# Patient Record
Sex: FEMALE | Race: WHITE | NOT HISPANIC OR LATINO | URBAN - METROPOLITAN AREA
[De-identification: names, ages, dates, MRNs, and addresses within clinical notes are randomized per-mention and may not be internally consistent; named-entity substitution may affect disease eponyms.]

---

## 2021-12-28 ENCOUNTER — OFFICE (OUTPATIENT)
Dept: URBAN - METROPOLITAN AREA CLINIC 8 | Facility: CLINIC | Age: 76
End: 2021-12-28

## 2021-12-28 VITALS
WEIGHT: 250 LBS | SYSTOLIC BLOOD PRESSURE: 135 MMHG | OXYGEN SATURATION: 98 % | HEART RATE: 85 BPM | DIASTOLIC BLOOD PRESSURE: 68 MMHG | HEIGHT: 65 IN

## 2021-12-28 DIAGNOSIS — D50.9 IRON DEFICIENCY ANEMIA, UNSPECIFIED: ICD-10-CM

## 2021-12-28 DIAGNOSIS — N39.0 URINARY TRACT INFECTION, SITE NOT SPECIFIED: ICD-10-CM

## 2021-12-28 DIAGNOSIS — R74.9 ABNORMAL SERUM ENZYME LEVEL, UNSPECIFIED: ICD-10-CM

## 2021-12-28 PROCEDURE — 99203 OFFICE O/P NEW LOW 30 MIN: CPT | Performed by: NURSE PRACTITIONER

## 2022-01-03 ENCOUNTER — OFFICE (OUTPATIENT)
Dept: URBAN - METROPOLITAN AREA CLINIC 8 | Facility: CLINIC | Age: 77
End: 2022-01-03

## 2022-01-03 LAB
ACTIN (SMOOTH MUSCLE) ANTIBODY: 20 UNITS — HIGH (ref 0–19)
ALK PHOS ISOENZYME: BONE FRACTION: 26 % (ref 14–68)
ALK PHOS ISOENZYME: INTESTINAL FRAC.: 0 % (ref 0–18)
ALK PHOS ISOENZYME: LIVER FRACTION: 74 % (ref 18–85)
ANTINUCLEAR ANTIBODIES, IFA: POSITIVE
CBC, PLATELET, NO DIFFERENTIAL: HEMATOCRIT: 38.1 % (ref 34–46.6)
CBC, PLATELET, NO DIFFERENTIAL: HEMOGLOBIN: 12.9 G/DL (ref 11.1–15.9)
CBC, PLATELET, NO DIFFERENTIAL: MCH: 34.8 PG — HIGH (ref 26.6–33)
CBC, PLATELET, NO DIFFERENTIAL: MCHC: 33.9 G/DL (ref 31.5–35.7)
CBC, PLATELET, NO DIFFERENTIAL: MCV: 103 FL — HIGH (ref 79–97)
CBC, PLATELET, NO DIFFERENTIAL: PLATELETS: 263 X10E3/UL (ref 150–450)
CBC, PLATELET, NO DIFFERENTIAL: RBC: 3.71 X10E6/UL — LOW (ref 3.77–5.28)
CBC, PLATELET, NO DIFFERENTIAL: RDW: 13.3 % (ref 11.7–15.4)
CBC, PLATELET, NO DIFFERENTIAL: WBC: 3.7 X10E3/UL (ref 3.4–10.8)
CERULOPLASMIN: 38.4 MG/DL (ref 19–39)
COMP. METABOLIC PANEL (14): A/G RATIO: 0.9 — LOW (ref 1.2–2.2)
COMP. METABOLIC PANEL (14): ALBUMIN: 3.7 G/DL (ref 3.7–4.7)
COMP. METABOLIC PANEL (14): ALKALINE PHOSPHATASE: 122 IU/L — HIGH (ref 44–121)
COMP. METABOLIC PANEL (14): ALT (SGPT): 25 IU/L (ref 0–32)
COMP. METABOLIC PANEL (14): AST (SGOT): 34 IU/L (ref 0–40)
COMP. METABOLIC PANEL (14): BILIRUBIN, TOTAL: 0.5 MG/DL (ref 0–1.2)
COMP. METABOLIC PANEL (14): BUN/CREATININE RATIO: 10 — LOW (ref 12–28)
COMP. METABOLIC PANEL (14): BUN: 9 MG/DL (ref 8–27)
COMP. METABOLIC PANEL (14): CALCIUM: 9.5 MG/DL (ref 8.7–10.3)
COMP. METABOLIC PANEL (14): CARBON DIOXIDE, TOTAL: 26 MMOL/L (ref 20–29)
COMP. METABOLIC PANEL (14): CHLORIDE: 102 MMOL/L (ref 96–106)
COMP. METABOLIC PANEL (14): CREATININE: 0.87 MG/DL (ref 0.57–1)
COMP. METABOLIC PANEL (14): EGFR IF AFRICN AM: 74 ML/MIN/1.73 (ref 59–?)
COMP. METABOLIC PANEL (14): EGFR IF NONAFRICN AM: 64 ML/MIN/1.73 (ref 59–?)
COMP. METABOLIC PANEL (14): GLOBULIN, TOTAL: 4.1 G/DL (ref 1.5–4.5)
COMP. METABOLIC PANEL (14): GLUCOSE: 107 MG/DL — HIGH (ref 65–99)
COMP. METABOLIC PANEL (14): POTASSIUM: 4.5 MMOL/L (ref 3.5–5.2)
COMP. METABOLIC PANEL (14): PROTEIN, TOTAL: 7.8 G/DL (ref 6–8.5)
COMP. METABOLIC PANEL (14): SODIUM: 140 MMOL/L (ref 134–144)
FANA STAINING PATTERNS: HOMOGENEOUS PATTERN: HIGH
FANA STAINING PATTERNS: NOTE: (no result)
FE+TIBC+FER: FERRITIN: 626 NG/ML — HIGH (ref 15–150)
FE+TIBC+FER: IRON BIND.CAP.(TIBC): 235 UG/DL — LOW (ref 250–450)
FE+TIBC+FER: IRON SATURATION: 41 % (ref 15–55)
FE+TIBC+FER: IRON: 97 UG/DL (ref 27–139)
FE+TIBC+FER: UIBC: 138 UG/DL (ref 118–369)
HEPATITIS PANEL (4): HBSAG SCREEN: NEGATIVE
HEPATITIS PANEL (4): HEP A AB, IGM: NEGATIVE
HEPATITIS PANEL (4): HEP B CORE AB, IGM: NEGATIVE
HEPATITIS PANEL (4): HEP C VIRUS AB: <0.1 S/CO RATIO
MITOCHONDRIAL (M2) ANTIBODY: <20 UNITS
PROTHROMBIN TIME (PT): INR: 1 (ref 0.9–1.2)
PROTHROMBIN TIME (PT): PROTHROMBIN TIME: 10.9 SEC (ref 9.1–12)
VITAMIN B12 AND FOLATE: FOLATE (FOLIC ACID), SERUM: >20 NG/ML
VITAMIN B12 AND FOLATE: VITAMIN B12: 512 PG/ML (ref 232–1245)

## 2022-02-17 ENCOUNTER — ON CAMPUS - OUTPATIENT (OUTPATIENT)
Dept: URBAN - METROPOLITAN AREA HOSPITAL 97 | Facility: HOSPITAL | Age: 77
End: 2022-02-17

## 2022-02-17 DIAGNOSIS — K57.10 DIVERTICULOSIS OF SMALL INTESTINE WITHOUT PERFORATION OR ABS: ICD-10-CM

## 2022-02-17 PROCEDURE — 43235 EGD DIAGNOSTIC BRUSH WASH: CPT | Performed by: SPECIALIST

## 2022-03-08 ENCOUNTER — OFFICE (OUTPATIENT)
Dept: URBAN - METROPOLITAN AREA CLINIC 8 | Facility: CLINIC | Age: 77
End: 2022-03-08

## 2022-03-08 VITALS
OXYGEN SATURATION: 96 % | WEIGHT: 247 LBS | HEIGHT: 65 IN | DIASTOLIC BLOOD PRESSURE: 49 MMHG | HEART RATE: 56 BPM | SYSTOLIC BLOOD PRESSURE: 139 MMHG

## 2022-03-08 DIAGNOSIS — R74.8 ABNORMAL LEVELS OF OTHER SERUM ENZYMES: ICD-10-CM

## 2022-03-08 DIAGNOSIS — K80.50 CALCULUS OF BILE DUCT WITHOUT CHOLANGITIS OR CHOLECYSTITIS W: ICD-10-CM

## 2022-03-08 PROCEDURE — 99213 OFFICE O/P EST LOW 20 MIN: CPT | Performed by: SPECIALIST

## 2022-04-18 ENCOUNTER — ON CAMPUS - OUTPATIENT (OUTPATIENT)
Dept: URBAN - METROPOLITAN AREA HOSPITAL 97 | Facility: HOSPITAL | Age: 77
End: 2022-04-18
Payer: MEDICARE

## 2022-04-18 DIAGNOSIS — K57.10 DIVERTICULOSIS OF SMALL INTESTINE WITHOUT PERFORATION OR ABS: ICD-10-CM

## 2022-04-18 DIAGNOSIS — K80.50 CALCULUS OF BILE DUCT WITHOUT CHOLANGITIS OR CHOLECYSTITIS W: ICD-10-CM

## 2022-04-18 PROCEDURE — 43262 ENDO CHOLANGIOPANCREATOGRAPH: CPT | Performed by: INTERNAL MEDICINE

## 2022-04-18 PROCEDURE — 43264 ERCP REMOVE DUCT CALCULI: CPT | Performed by: INTERNAL MEDICINE

## 2022-05-03 ENCOUNTER — OFFICE (OUTPATIENT)
Dept: URBAN - METROPOLITAN AREA CLINIC 8 | Facility: CLINIC | Age: 77
End: 2022-05-03
Payer: MEDICARE

## 2022-05-03 VITALS
HEART RATE: 58 BPM | WEIGHT: 246 LBS | HEIGHT: 65 IN | DIASTOLIC BLOOD PRESSURE: 58 MMHG | SYSTOLIC BLOOD PRESSURE: 147 MMHG | OXYGEN SATURATION: 96 %

## 2022-05-03 DIAGNOSIS — K59.00 CONSTIPATION, UNSPECIFIED: ICD-10-CM

## 2022-05-03 DIAGNOSIS — K76.0 FATTY (CHANGE OF) LIVER, NOT ELSEWHERE CLASSIFIED: ICD-10-CM

## 2022-05-03 DIAGNOSIS — R74.8 ABNORMAL LEVELS OF OTHER SERUM ENZYMES: ICD-10-CM

## 2022-05-03 DIAGNOSIS — R79.89 OTHER SPECIFIED ABNORMAL FINDINGS OF BLOOD CHEMISTRY: ICD-10-CM

## 2022-05-03 DIAGNOSIS — K80.50 CALCULUS OF BILE DUCT WITHOUT CHOLANGITIS OR CHOLECYSTITIS W: ICD-10-CM

## 2022-05-03 LAB
ACTIN (SMOOTH MUSCLE) ANTIBODY: 11 UNITS (ref 0–19)
ANTINUCLEAR ANTIBODIES, IFA: POSITIVE
COMP. METABOLIC PANEL (14): A/G RATIO: 1.3 (ref 1.2–2.2)
COMP. METABOLIC PANEL (14): ALBUMIN: 4.3 G/DL (ref 3.7–4.7)
COMP. METABOLIC PANEL (14): ALKALINE PHOSPHATASE: 57 IU/L (ref 44–121)
COMP. METABOLIC PANEL (14): ALT (SGPT): 17 IU/L (ref 0–32)
COMP. METABOLIC PANEL (14): AST (SGOT): 18 IU/L (ref 0–40)
COMP. METABOLIC PANEL (14): BILIRUBIN, TOTAL: 0.4 MG/DL (ref 0–1.2)
COMP. METABOLIC PANEL (14): BUN/CREATININE RATIO: 21 (ref 12–28)
COMP. METABOLIC PANEL (14): BUN: 16 MG/DL (ref 8–27)
COMP. METABOLIC PANEL (14): CALCIUM: 9.5 MG/DL (ref 8.7–10.3)
COMP. METABOLIC PANEL (14): CARBON DIOXIDE, TOTAL: 24 MMOL/L (ref 20–29)
COMP. METABOLIC PANEL (14): CHLORIDE: 102 MMOL/L (ref 96–106)
COMP. METABOLIC PANEL (14): CREATININE: 0.78 MG/DL (ref 0.57–1)
COMP. METABOLIC PANEL (14): EGFR: 78 ML/MIN/1.73 (ref 59–?)
COMP. METABOLIC PANEL (14): GLOBULIN, TOTAL: 3.3 G/DL (ref 1.5–4.5)
COMP. METABOLIC PANEL (14): GLUCOSE: 86 MG/DL (ref 65–99)
COMP. METABOLIC PANEL (14): POTASSIUM: 4.3 MMOL/L (ref 3.5–5.2)
COMP. METABOLIC PANEL (14): PROTEIN, TOTAL: 7.6 G/DL (ref 6–8.5)
COMP. METABOLIC PANEL (14): SODIUM: 141 MMOL/L (ref 134–144)
FANA STAINING PATTERNS: HOMOGENEOUS PATTERN: HIGH
FANA STAINING PATTERNS: NOTE: (no result)
PROTHROMBIN TIME (PT): INR: 1 (ref 0.9–1.2)
PROTHROMBIN TIME (PT): PROTHROMBIN TIME: 10.5 SEC (ref 9.1–12)

## 2022-05-03 PROCEDURE — 99213 OFFICE O/P EST LOW 20 MIN: CPT | Performed by: NURSE PRACTITIONER

## 2022-05-03 NOTE — SERVICEHPINOTES
Ms. Armstrong is a pleasant 76-year-old  female with a PMH significant for essential hypertension, hypercholesterolemia, fatty liver, glaucoma, laparoscopic cholecystectomy for gallstones, diabetes mellitus-non-insulin-dependent.   Patient presents for follow-up post ERCP for choledocholithiasis with sphincterotomy and stone extraction 04/18/2022.  Patient originally was sent to gastro 1 December 2021 for mildly elevated liver enzymes.  She did have some improvement of her liver enzymes and a full liver workup was obtained.  Smooth muscle antibody was elevated at 20 and MAGUI positive with BIRD 1:320. Ceruloplasmin negative, mitochondrial antibody negative, acute hepatitis panel negative, INR 1.0, iron profile okay, alkaline phosphatase 122, total bilirubin 0.5, AST 34, ALT 25, hemoglobin 12.9, hematocrit 38, , MCH 34.8. Platelets 263. Spoke with Dr. Galeano and at that time her liver enzymes had improved since her hospitalization. Fibroscan Jan 2022, which showed fatty liver with F2-3, but also noted dilation of common bile duct at 12 mm.We then proceeded with MRI/MRCP that noted common bile duct bowel relation with possible choledocholithiasis. Attempted ERCP unsuccessful by Dr. Galeano February 2022. EGD only performed and noted periampullary diverticulum. We then referred patient to Dr. Campa for repeat ERCP and this was performed 04/18/2022 with sphincterotomy and successful stone extraction of CBD. Patient is doing well post ERCP and no complaints at this time. No fever, chills, sweats. Does admit to chronic constipation at baseline and states she has to take a stimulant laxative to have a bowel movement. She states she would only have 1 bowel movement per week if she did not take laxatives. Still waiting on colonoscopy report from [facility]. States she had 1 through 4 years ago and unremarkable. Will request report again.jessica
brOV HPI 12/28/22:br    Patient was referred due to elevation of LFTs. Patient was seen by PCP in November of 2021 and reportedly elevated liver enzymes and abdominal ultrasound was obtained 11/02/2021 liver normal in size, appearance and echogenicity. No focal mass or intrahepatic ductal dilatation noted. Gallbladder surgically absent. Normal color Doppler evaluation of the portal vein. Common bile duct 2.8 mm.Patient was then seen at [facility] ER 12/06/2021 where she was experiencing fatigue, dysuria, generalized weakness, mild shortness of breath. She states she also had a fever and was diagnosed with urinary tract infection. She was started on Keflex p.o. times 5-7 days and received IV fluids and was discharged from the ER. Patient states she completed her antibiotic. Labs at the hospital 12/06/2021 revealed an albumin of 2.0, alkaline phosphatase 279, , , total bilirubin 1.9, BUN 8.4, WBC 12.9, hemoglobin 9.4, hematocrit 30.7, , MCH 33, platelets 449. Patient states she was then seen by her PCP 1 week later and was told she was iron deficient, but states no labs were obtained. She states she was started on ferrous sulfate 325 mg p.o. b.i.d.. Will try to obtain any recent labs from PCP.Patient did have some nausea and 1 forced episode of vomiting on 12/06/2021 when she had a UTI. None since. No complaints of abdominal pain or right upper quadrant pain. Denies any history of hepatitis A, B, or C. Does not drink alcohol. No over-the-counter or herbal supplements. No weight loss supplements. No recent new medications. States she has been off cholesterol medication for almost 1 year. Patient denies any issues with constipation or diarrhea. She states she has soft, formed, brown bowel movements. Since starting iron, her stools are on the darker side, but no tarry stools. No melena or hematochezia. No history of iron deficiency or GI bleeds in the past. No significant weight loss. No heartburn, reflux. No primary family history of colon cancer, stomach cancer, primary liver cancer.Patient states she has underwent colonoscopy at [facility] 3 years ago. Will try to obtain this report. Has never had EGD for any reason.

## 2022-05-03 NOTE — SERVICENOTES
will repeat SMA/ MAGUI/BIRD.  titer of 1:320 labs in January 2022. Dilation of CBD noted on fibroscan so MRI/MRCP  ordered confirming choledocholithiasis with dilated duct and focus was placed on ERCP for stone extraction.  Will repeat autoimmune workup and if remains positive would likely benefit from liver biopsy to rule out autoimmune hepatitis.

## 2022-11-01 ENCOUNTER — OFFICE (OUTPATIENT)
Dept: URBAN - METROPOLITAN AREA CLINIC 8 | Facility: CLINIC | Age: 77
End: 2022-11-01

## 2022-11-01 VITALS
DIASTOLIC BLOOD PRESSURE: 48 MMHG | HEART RATE: 54 BPM | OXYGEN SATURATION: 98 % | HEIGHT: 65 IN | WEIGHT: 247 LBS | SYSTOLIC BLOOD PRESSURE: 131 MMHG

## 2022-11-01 DIAGNOSIS — K80.50 CALCULUS OF BILE DUCT WITHOUT CHOLANGITIS OR CHOLECYSTITIS W: ICD-10-CM

## 2022-11-01 PROCEDURE — 99213 OFFICE O/P EST LOW 20 MIN: CPT | Performed by: SPECIALIST

## 2022-11-01 NOTE — SERVICENOTES
She agrees to check with PCP about when last colonoscopy was and when she's due for repeat.  We will be available at that time to schedule.  She's thinks it was about 5 years ago.